# Patient Record
(demographics unavailable — no encounter records)

---

## 2020-06-20 NOTE — EDM.PDOC
ED HPI GENERAL MEDICAL PROBLEM





- General


Stated Complaint: RIGHT HAND MIDDLE FINGER INFECTED DOG BITE


Time Seen by Provider: 06/20/20 03:50


Source of Information: Reports: Patient


History Limitations: Reports: No Limitations





- History of Present Illness


INITIAL COMMENTS - FREE TEXT/NARRATIVE: 





ED with c/o increased throbbing to right middle finger, biter by friends dog, 

older lloyd retriever tonight. Dog reported up to date with shots. Ibuprofen 

PTA. No fever chills. 


Patient corrects that bit Thursday night, seen in clinic on Friday and given 

tetnus update and started on Augmentin. Taken 2 doses total. 


  ** Right Finger-Index


Pain Score (Numeric/FACES): 6





- Related Data


                                    Allergies











Allergy/AdvReac Type Severity Reaction Status Date / Time


 


No Known Allergies Allergy   Verified 06/20/20 03:53














ED ROS GENERAL





- Review of Systems


Review Of Systems: Comprehensive ROS is negative, except as noted in HPI.





ED EXAM, ANIMAL BITE





- Physical Exam


Exam: See Below


Exam Limited By: No Limitations


General Appearance: Alert, Mild Distress


Eye Exam: Bilateral Eye: EOMI


Ears: Normal External Exam


Nose: Normal Inspection


Throat/Mouth: Normal Inspection


Head: Atraumatic


Neck: Normal Inspection


Respiratory/Chest: No Respiratory Distress


Cardiovascular: Normal Peripheral Pulses


Extremities: Limited Range of Motion (right 3rd finger)


Neurological: Alert, Oriented, Normal Cognition


Psychiatric: Normal Affect, Normal Mood


Skin Exam: Other (puncture wound to fat padmedial MIP,  superficial scratch x2 

palmar fatpad below MIP on 4th. bruising soft tissue lateral MIP)





Course





- Vital Signs


Last Recorded V/S: 


                                Last Vital Signs











Temp  97.1 F   06/20/20 03:47


 


Pulse  77   06/20/20 03:47


 


Resp  18   06/20/20 03:47


 


BP  118/68   06/20/20 03:47


 


Pulse Ox  98   06/20/20 03:47














- Orders/Labs/Meds


Orders: 


                               Active Orders 24 hr











 Category Date Time Status


 


 cefTRIAXone [Rocephin] 1 gm Med  06/20/20 04:13 Active





 Lidocaine 1% [Xylocaine-MPF 1%] 2.1 ml   





 IM ONETIME   











Meds: 


Medications














Discontinued Medications














Generic Name Dose Route Start Last Admin





  Trade Name Freq  PRN Reason Stop Dose Admin


 


Amoxicillin/Clavulanate Potassium  1 tab  06/20/20 04:03 





  Augmentin 875 Mg/125 Mg  PO  06/20/20 04:04 





  ONETIME ONE  














Departure





- Departure


Time of Disposition: 04:03


Disposition: Home, Self-Care 01


Condition: Good


Clinical Impression: 


Dog bite


Qualifiers:


 Encounter type: initial encounter Qualified Code(s): W54.0XXA - Bitten by dog, 

initial encounter








- Discharge Information


*PRESCRIPTION DRUG MONITORING PROGRAM REVIEWED*: No


*COPY OF PRESCRIPTION DRUG MONITORING REPORT IN PATIENT ARJUN: No


Instructions:  Animal Bite, Adult, Easy-to-Read, Wound Infection, Easy-to-Read


Additional Instructions: 


warm soak 3 times daily 15 minutes


bandage dressing to open areas


augmentin 875/125  take one pill twice daily as ordered


 elevate


follow up in clinic this coming week for recheck,   sooner if increased redness 

and drainage from wound





Sepsis Event Note (ED)





- Focused Exam


Vital Signs: 


                                   Vital Signs











  Temp Pulse Resp BP Pulse Ox


 


 06/20/20 03:47  97.1 F  77  18  118/68  98














- My Orders


Last 24 Hours: 


My Active Orders





06/20/20 04:13


cefTRIAXone [Rocephin] 1 gm Lidocaine 1% [Xylocaine-MPF 1%] 2.1 ml IM ONETIME 














- Assessment/Plan


Last 24 Hours: 


My Active Orders





06/20/20 04:13


cefTRIAXone [Rocephin] 1 gm Lidocaine 1% [Xylocaine-MPF 1%] 2.1 ml IM ONETIME